# Patient Record
Sex: FEMALE | Race: WHITE | Employment: UNEMPLOYED | URBAN - METROPOLITAN AREA
[De-identification: names, ages, dates, MRNs, and addresses within clinical notes are randomized per-mention and may not be internally consistent; named-entity substitution may affect disease eponyms.]

---

## 2022-02-24 ENCOUNTER — OFFICE VISIT (OUTPATIENT)
Dept: INTERNAL MEDICINE CLINIC | Age: 32
End: 2022-02-24
Payer: MEDICAID

## 2022-02-24 VITALS
HEART RATE: 85 BPM | OXYGEN SATURATION: 98 % | BODY MASS INDEX: 46.48 KG/M2 | RESPIRATION RATE: 19 BRPM | TEMPERATURE: 98.3 F | HEIGHT: 65 IN | DIASTOLIC BLOOD PRESSURE: 86 MMHG | SYSTOLIC BLOOD PRESSURE: 127 MMHG | WEIGHT: 279 LBS

## 2022-02-24 DIAGNOSIS — F41.8 ANXIETY WITH DEPRESSION: ICD-10-CM

## 2022-02-24 DIAGNOSIS — Z76.89 ESTABLISHING CARE WITH NEW DOCTOR, ENCOUNTER FOR: Primary | ICD-10-CM

## 2022-02-24 DIAGNOSIS — E01.0 THYROMEGALY: ICD-10-CM

## 2022-02-24 DIAGNOSIS — E66.01 MORBID OBESITY (HCC): ICD-10-CM

## 2022-02-24 PROCEDURE — 99203 OFFICE O/P NEW LOW 30 MIN: CPT | Performed by: INTERNAL MEDICINE

## 2022-02-24 RX ORDER — SERTRALINE HYDROCHLORIDE 50 MG/1
50 TABLET, FILM COATED ORAL DAILY
Qty: 60 TABLET | Refills: 1 | Status: SHIPPED | OUTPATIENT
Start: 2022-02-24 | End: 2022-07-15

## 2022-02-24 RX ORDER — NORETHINDRONE ACETATE AND ETHINYL ESTRADIOL 1; .02 MG/1; MG/1
TABLET ORAL
COMMUNITY
Start: 2021-02-10 | End: 2022-02-24 | Stop reason: SDUPTHER

## 2022-02-24 RX ORDER — NORETHINDRONE ACETATE AND ETHINYL ESTRADIOL 1; .02 MG/1; MG/1
1 TABLET ORAL DAILY
Qty: 3 DOSE PACK | Refills: 1 | Status: SHIPPED | OUTPATIENT
Start: 2022-02-24

## 2022-02-24 NOTE — PROGRESS NOTES
Arie De La Cruz is a 36 y.o. female and presents with [facility]    . Subjective:    New patient. [facility]  Pt has been feeling depression, worse after the dealth of her mother in . Very bad in the last 2 mths. Pt denies SI. 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 PMH-post partum depression- tx briefly w zoloft   Childhood asthma   morbid obesity    PSH- none    SH- - 10 hildren age 3 yo- 15 yo   Unemployed   No tob/illicit drugs occas alcohol    FH- mother  MS, pulm embolism   Father ? ? Siblings- healthy      Immunizations  Eye care  Dental care  Pap 1 year ago. Has not established w gyn as yet        Review of Systems  Review of systems (12) negative, except noted above. No past medical history on file. No past surgical history on file. Social History     Socioeconomic History    Marital status:      Social Determinants of Health     Physical Activity: Unknown    Days of Exercise per Week: 0 days     No family history on file. Not on File    Objective:  Visit Vitals  Temp 98.3 °F (36.8 °C) (Temporal)   Ht 5' 5\" (1.651 m)   Wt 279 lb (126.6 kg)   LMP 2022   BMI 46.43 kg/m²     Physical Exam:   General appearance - alert, obese. Pleasant. Tearful when discussing her mother  Mental status - alert, oriented to person, place, and time  EYE-SARA, EOMI, corneas normal, no foreign bodies  ENT-ENT exam normal, no neck nodes or sinus tenderness  Nose - normal and patent, no erythema, discharge or polyps  Mouth - mucous membranes moist, pharynx normal without lesions  Neck - supple, + thyromeg vs body habitus  Chest - clear to auscultation, no wheezes, rales or rhonchi, symmetric air entry   Heart - normal rate, regular rhythm, normal S1, S2, 5-5/0 systolic murmur  Abdomen - soft, nontender, obese +bs  Ext-peripheral pulses normal, no pedal edema, no clubbing or cyanosis  Skin-Warm and dry.  no hyperpigmentation, vitiligo, or suspicious lesions  Neuro -alert, oriented, normal speech, no focal findings or movement disorder noted        No results found for this or any previous visit. Assessment/Plan:  No diagnosis found. No orders of the defined types were placed in this encounter. 1. Establishing care with new doctor, encounter for  Will need gyn referral [place]    - norethindrone-ethinyl estradiol (Aurovela 1/20, 21,) 1-20 mg-mcg tablet; Take 1 Tablet by mouth daily. Dispense: 3 Dose Pack; Refill: 1  - THYROID CASCADE PROFILE; Future  - CBC WITH AUTOMATED DIFF; Future  - LIPID PANEL; Future  - METABOLIC PANEL, COMPREHENSIVE; Future    2. Anxiety with depression  Refer to therapist as well    - sertraline (ZOLOFT) 50 mg tablet; Take 1 Tablet by mouth daily. Dispense: 60 Tablet; Refill: 1  - REFERRAL TO PSYCHIATRY    3. Thyromegaly  *  - US THYROID/PARATHYROID/SOFT TISS; Future    4. Morbid obesity ([facility] [address]  Will address in the near future    There are no Patient Instructions on file for this visit. I have reviewed with the patient details of the assessment and plan and all questions were answered. Relevent patient education was performed. The most recent lab findings were reviewed with the patient. An After Visit Summary was printed and given to the patient.       Amandeep Barrett MD

## 2022-02-24 NOTE — PROGRESS NOTES
Chief Complaint   Patient presents with    Establish Care    Depression    Anxiety     1. Have you been to the ER, urgent care clinic since your last visit? Hospitalized since your last visit? No    2. Have you seen or consulted any other health care providers outside of the [address] [place] [address] since your last visit? Include any pap smears or colon screening.  No       3 most recent PHQ Screens 2/24/2022   Little interest or pleasure in doing things Nearly every day   Feeling down, depressed, irritable, or hopeless Nearly every day   Total Score PHQ 2 6   Trouble falling or staying asleep, or sleeping too much More than half the days   Feeling tired or having little energy Nearly every day   Poor appetite, weight loss, or overeating Nearly every day   Feeling bad about yourself - or that you are a failure or have let yourself or your family down Nearly every day   Trouble concentrating on things such as school, work, reading, or watching TV Nearly every day   Moving or speaking so slowly that other people could have noticed; or the opposite being so fidgety that others notice Several days   Thoughts of being better off dead, or hurting yourself in some way Not at all   PHQ 9 Score 21   How difficult have these problems made it for you to do your work, take care of your home and get along with others Extremely difficult

## 2022-02-24 NOTE — PATIENT INSTRUCTIONS

## 2023-02-09 ENCOUNTER — TELEPHONE (OUTPATIENT)
Dept: FAMILY MEDICINE CLINIC | Age: 33
End: 2023-02-09